# Patient Record
Sex: MALE | Race: WHITE | NOT HISPANIC OR LATINO | Employment: STUDENT | URBAN - METROPOLITAN AREA
[De-identification: names, ages, dates, MRNs, and addresses within clinical notes are randomized per-mention and may not be internally consistent; named-entity substitution may affect disease eponyms.]

---
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
## 2024-05-28 ENCOUNTER — APPOINTMENT (OUTPATIENT)
Dept: PRIMARY CARE | Facility: CLINIC | Age: 19
End: 2024-05-28
Payer: COMMERCIAL

## 2024-07-25 ENCOUNTER — APPOINTMENT (OUTPATIENT)
Dept: ALLERGY | Facility: CLINIC | Age: 19
End: 2024-07-25
Payer: COMMERCIAL

## 2024-08-01 ENCOUNTER — APPOINTMENT (OUTPATIENT)
Dept: ALLERGY | Facility: CLINIC | Age: 19
End: 2024-08-01
Payer: COMMERCIAL

## 2024-08-01 DIAGNOSIS — T78.2XXA ANAPHYLAXIS, INITIAL ENCOUNTER: ICD-10-CM

## 2024-08-01 DIAGNOSIS — Z91.012 ALLERGY TO EGGS: Primary | ICD-10-CM

## 2024-08-01 DIAGNOSIS — J45.20 MILD INTERMITTENT ASTHMA WITHOUT COMPLICATION (HHS-HCC): ICD-10-CM

## 2024-08-01 PROCEDURE — 99214 OFFICE O/P EST MOD 30 MIN: CPT | Performed by: ALLERGY & IMMUNOLOGY

## 2024-08-01 RX ORDER — EPINEPHRINE 0.3 MG/.3ML
1 INJECTION, SOLUTION INTRAMUSCULAR ONCE AS NEEDED
Qty: 2 EACH | Refills: 5 | Status: SHIPPED | OUTPATIENT
Start: 2024-08-01

## 2024-08-01 RX ORDER — ALBUTEROL SULFATE 90 UG/1
2 AEROSOL, METERED RESPIRATORY (INHALATION) EVERY 4 HOURS PRN
Qty: 20.1 G | Refills: 5 | Status: SHIPPED | OUTPATIENT
Start: 2024-08-01

## 2024-08-01 NOTE — PROGRESS NOTES
Subjective   Patient ID:   [id number]   Jarod Demarco is a 18 y.o. male who presents for Food Allergy and Wheezing.    Chief Complaint   Patient presents with    Food Allergy    Wheezing          HPI  This patient is here to evaluate for:    He had a pizza and crust had egg in it. Noticed it on the website. No allergic reaction.  But only a 1/4 of a slice.    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   has had reations even with baked egg foods.  Despite pos tests, he now tolerates TREE nuts and reports he does eat them.     While camping, Jarad had egg and he threw up one time.   Never has had to use Auvi-Q.     Not using allergy eye drops nor antihistamines, unless rarely, like around a house with a dog when he may use albtuerol also.   rare albuterol use otherwise.    Review of Systems   All other systems reviewed and are negative.        Objective     There were no vitals taken for this visit.     Physical Exam  Constitutional:       Appearance: Normal appearance.   HENT:      Head: Normocephalic and atraumatic.   Eyes:      Extraocular Movements: Extraocular movements intact.      Conjunctiva/sclera: Conjunctivae normal.      Pupils: Pupils are equal, round, and reactive to light.   Pulmonary:      Effort: Pulmonary effort is normal.   Musculoskeletal:      Cervical back: Normal range of motion.   Skin:     Findings: No rash.   Neurological:      Mental Status: He is alert and oriented to person, place, and time. Mental status is at baseline.   Psychiatric:         Mood and Affect: Mood normal.         Behavior: Behavior normal.         Thought Content: Thought content normal.         Judgment: Judgment normal.            Current Outpatient Medications   Medication Sig  Dispense Refill    albuterol (ProAir RespiClick) 90 mcg/actuation aerosol powdr breath activated inhaler Inhale.      albuterol 90 mcg/actuation inhaler Inhale 2 puffs every 4 hours if needed for wheezing, shortness of breath or other (cough). 20.1 g 5    Auvi-Q 0.3 mg/0.3 mL injection syringe Inject 0.3 mL (0.3 mg) into the muscle 1 time if needed for anaphylaxis for up to 1 dose. Inject into upper leg. Call 911 after use. 2 each 5     No current facility-administered medications for this visit.       Summary of the labs over the past 6 months:    No visits with results within 6 Month(s) from this visit.   Latest known visit with results is:   Orders Only on 07/03/2023   Component Date Value Ref Range Status    WBC 07/03/2023 5.2  4.4 - 11.3 x10E9/L Final    RBC 07/03/2023 5.20  4.50 - 5.90 x10E12/L Final    Hemoglobin 07/03/2023 15.0  13.5 - 17.5 g/dL Final    Hematocrit 07/03/2023 46.0  41.0 - 52.0 % Final    MCV 07/03/2023 88  80 - 100 fL Final    MCHC 07/03/2023 32.6  32.0 - 36.0 g/dL Final    Platelets 07/03/2023 254  150 - 450 x10E9/L Final    RDW 07/03/2023 13.5  11.5 - 14.5 % Final    Neutrophils % 07/03/2023 45.5  40.0 - 80.0 % Final    Immature Granulocytes %, Automated 07/03/2023 0.4  0.0 - 0.9 % Final    Lymphocytes % 07/03/2023 38.4  13.0 - 44.0 % Final    Monocytes % 07/03/2023 9.2  2.0 - 10.0 % Final    Eosinophils % 07/03/2023 5.7  0.0 - 6.0 % Final    Basophils % 07/03/2023 0.8  0.0 - 2.0 % Final    Neutrophils Absolute 07/03/2023 2.39  1.20 - 7.70 x10E9/L Final    Lymphocytes Absolute 07/03/2023 2.01  1.20 - 4.80 x10E9/L Final    Monocytes Absolute 07/03/2023 0.48  0.10 - 1.00 x10E9/L Final    Eosinophils Absolute 07/03/2023 0.30  0.00 - 0.70 x10E9/L Final    Basophils Absolute 07/03/2023 0.04  0.00 - 0.10 x10E9/L Final    Albumin 07/03/2023 4.5  3.4 - 5.0 g/dL Final    Total Bilirubin 07/03/2023 0.5  0.0 - 1.2 mg/dL Final    Bilirubin, Direct 07/03/2023 0.1  0.0 - 0.3 mg/dL Final    Alkaline  Phosphatase 07/03/2023 97  33 - 120 U/L Final    ALT (SGPT) 07/03/2023 14  10 - 52 U/L Final    AST 07/03/2023 21  9 - 39 U/L Final    Total Protein 07/03/2023 6.8  6.4 - 8.2 g/dL Final    Triglycerides 07/03/2023 77  0 - 149 mg/dL Final    Cholesterol 07/03/2023 157  0 - 199 mg/dL Final         Assessment/Plan       Nice to see you!    We refilled the medications.     We also discussed signs and symptoms and treatment of anaphylaxis. Your allergy can be life threatening and triggers need to be avoided. In case of a mild reaction limited to the skin, then an antihistamine may be used.  If there is a more severe allergic reaction such as throat tightness, wheezing, shortness of breath, vomiting, or other signs of anaphylaxis, then epinephrine (AUVI-Q) should be used.  We discussed how and when to use this medication.  Epinephrine expires after one year and should not be kept in a hot or cold location as this will degrade the medication.     The patient may use albuterol taking two puffs 15 minutes prior to exercise or every four hours as needed for cough, wheeze, or shortness of breath.    No need for testing today but we can discuss next year, if interested.     Follow-up in 1 year so we may re-assess you and develop a more long term plan.      Zachary Javier MD

## 2024-08-08 ENCOUNTER — APPOINTMENT (OUTPATIENT)
Dept: PRIMARY CARE | Facility: CLINIC | Age: 19
End: 2024-08-08
Payer: COMMERCIAL

## 2024-08-08 VITALS
RESPIRATION RATE: 15 BRPM | HEIGHT: 70 IN | OXYGEN SATURATION: 96 % | WEIGHT: 134 LBS | SYSTOLIC BLOOD PRESSURE: 130 MMHG | BODY MASS INDEX: 19.18 KG/M2 | TEMPERATURE: 97.1 F | DIASTOLIC BLOOD PRESSURE: 85 MMHG | HEART RATE: 74 BPM

## 2024-08-08 DIAGNOSIS — K12.0 APHTHOUS ULCER OF MOUTH: ICD-10-CM

## 2024-08-08 DIAGNOSIS — E55.9 VITAMIN D DEFICIENCY: ICD-10-CM

## 2024-08-08 DIAGNOSIS — Z13.29 THYROID DISORDER SCREEN: ICD-10-CM

## 2024-08-08 DIAGNOSIS — D84.9 IMMUNE DEFICIENCY DISORDER (MULTI): ICD-10-CM

## 2024-08-08 DIAGNOSIS — R19.5 LOOSE STOOLS: ICD-10-CM

## 2024-08-08 DIAGNOSIS — J45.20 MILD INTERMITTENT ASTHMA WITHOUT COMPLICATION (HHS-HCC): ICD-10-CM

## 2024-08-08 DIAGNOSIS — Z00.00 ANNUAL PHYSICAL EXAM: Primary | ICD-10-CM

## 2024-08-08 DIAGNOSIS — Z00.129 WELL ADOLESCENT VISIT: ICD-10-CM

## 2024-08-08 DIAGNOSIS — J84.10 PULMONARY GRANULOMA (MULTI): ICD-10-CM

## 2024-08-08 DIAGNOSIS — Z13.220 SCREENING FOR HYPERLIPIDEMIA: ICD-10-CM

## 2024-08-08 PROBLEM — R68.81 EARLY SATIETY: Status: RESOLVED | Noted: 2023-08-08 | Resolved: 2024-08-08

## 2024-08-08 PROBLEM — L03.213 PRESEPTAL CELLULITIS OF LEFT EYE: Status: RESOLVED | Noted: 2023-08-08 | Resolved: 2024-08-08

## 2024-08-08 PROCEDURE — 1036F TOBACCO NON-USER: CPT | Performed by: FAMILY MEDICINE

## 2024-08-08 PROCEDURE — 3008F BODY MASS INDEX DOCD: CPT | Performed by: FAMILY MEDICINE

## 2024-08-08 PROCEDURE — 99395 PREV VISIT EST AGE 18-39: CPT | Performed by: FAMILY MEDICINE

## 2024-08-08 RX ORDER — TRIAMCINOLONE ACETONIDE 1 MG/G
1 PASTE DENTAL 2 TIMES DAILY
COMMUNITY
End: 2024-08-08 | Stop reason: SDUPTHER

## 2024-08-08 RX ORDER — TRIAMCINOLONE ACETONIDE 1 MG/G
1 PASTE DENTAL 2 TIMES DAILY
Qty: 5 G | Refills: 2 | Status: SHIPPED | OUTPATIENT
Start: 2024-08-08

## 2024-08-08 ASSESSMENT — ENCOUNTER SYMPTOMS
ROS GI COMMENTS: INTERMITTENT LOOSE STOOLS
PALPITATIONS: 0
CHILLS: 0
FEVER: 0
ARTHRALGIAS: 0
ABDOMINAL PAIN: 0
CHEST TIGHTNESS: 0
SHORTNESS OF BREATH: 0
CONFUSION: 0

## 2024-08-08 ASSESSMENT — PATIENT HEALTH QUESTIONNAIRE - PHQ9
2. FEELING DOWN, DEPRESSED OR HOPELESS: NOT AT ALL
1. LITTLE INTEREST OR PLEASURE IN DOING THINGS: NOT AT ALL
SUM OF ALL RESPONSES TO PHQ9 QUESTIONS 1 AND 2: 0

## 2024-08-08 ASSESSMENT — COLUMBIA-SUICIDE SEVERITY RATING SCALE - C-SSRS
6. HAVE YOU EVER DONE ANYTHING, STARTED TO DO ANYTHING, OR PREPARED TO DO ANYTHING TO END YOUR LIFE?: NO
2. HAVE YOU ACTUALLY HAD ANY THOUGHTS OF KILLING YOURSELF?: NO
1. IN THE PAST MONTH, HAVE YOU WISHED YOU WERE DEAD OR WISHED YOU COULD GO TO SLEEP AND NOT WAKE UP?: NO

## 2024-08-08 NOTE — ASSESSMENT & PLAN NOTE
Keep a food diary with regards to symptoms and food and beverage consumption's.    Check labs    Over-the-counter Pepto for symptom relief since he says it does work    Return in 4 weeks to reassess case and review test results.

## 2024-08-08 NOTE — PROGRESS NOTES
"Subjective   Patient ID: Jarod Demarco is a 18 y.o. male who presents for Annual Exam.    HPI patient today for annual checkup the most part states he has been doing well he would like to get a refill on his Kenalog oral paste.  He says his asthma is been stable he really has not needed the albuterol inhaler.  He does point out that over the past 3 months he has noticed some GI upset issues he says it is very random but sometimes after eating he will get abdominal cramping and have to have a bowel movement which is rather loose.  Then he feels fine no nausea no vomiting over-the-counter Pepto seems to help alleviate symptoms.  Has not been able to identify definitive pattern or food trigger.  Denies any blood or mucus in the stools.  Appetite has been good.    Review of Systems   Constitutional:  Negative for chills and fever.   HENT:  Negative for congestion and ear pain.    Eyes:  Negative for visual disturbance.   Respiratory:  Negative for chest tightness and shortness of breath.    Cardiovascular:  Negative for chest pain and palpitations.   Gastrointestinal:  Negative for abdominal pain.        Intermittent loose stools   Musculoskeletal:  Negative for arthralgias.   Skin:  Negative for pallor.   Psychiatric/Behavioral:  Negative for confusion.        Objective   /85   Pulse 74   Temp 36.2 °C (97.1 °F)   Resp 15   Ht 1.778 m (5' 10\")   Wt 60.8 kg (134 lb)   SpO2 96%   BMI 19.23 kg/m²     Physical Exam  Vitals and nursing note reviewed.   Constitutional:       General: He is not in acute distress.     Appearance: Normal appearance. He is not ill-appearing.   HENT:      Head: Normocephalic and atraumatic.      Right Ear: Tympanic membrane, ear canal and external ear normal.      Left Ear: Tympanic membrane, ear canal and external ear normal.      Mouth/Throat:      Pharynx: Oropharynx is clear.   Eyes:      Extraocular Movements: Extraocular movements intact.   Cardiovascular:      Rate and " Rhythm: Normal rate and regular rhythm.      Pulses: Normal pulses.      Heart sounds: Normal heart sounds.   Pulmonary:      Effort: Pulmonary effort is normal.      Breath sounds: Normal breath sounds.   Abdominal:      General: Abdomen is flat. Bowel sounds are normal.      Palpations: Abdomen is soft.      Tenderness: There is no abdominal tenderness.   Musculoskeletal:         General: Normal range of motion.      Cervical back: Neck supple.   Skin:     General: Skin is warm.   Neurological:      Mental Status: He is alert and oriented to person, place, and time. Mental status is at baseline.   Psychiatric:         Mood and Affect: Mood normal.     Check fasting lab work    Keep a food diary    Call if anything worsens otherwise return in 4 weeks to reassess his case    Assessment/Plan   Problem List Items Addressed This Visit             ICD-10-CM    Asthma ([facility]) J45.909     Stable          Immune deficiency disorder (Multi) D84.9     Clinically stable follows with allergy and immunology         Pulmonary granuloma (Multi) J84.10     Clinically stable previous pulmonary evaluation unremarkable felt to be benign process.         Vitamin D deficiency E55.9     Check vitamin D level supplement as needed         Relevant Orders    Vitamin D 25-Hydroxy,Total (for eval of Vitamin D levels)    Well adolescent visit Z00.129    Thyroid disorder screen Z13.29     TSH with reflex         Relevant Orders    TSH with reflex to Free T4 if abnormal    Annual physical exam - Primary Z00.00     Check fasting labs    Flu shot in the fall    Maintain healthy diet and lifestyle habits         Relevant Orders    CBC    Comprehensive Metabolic Panel    Lipid Panel    TSH with reflex to Free T4 if abnormal    Screening for hyperlipidemia Z13.220     Fasting lipid         Relevant Orders    Lipid Panel    Loose stools R19.5     Keep a food diary with regards to symptoms and food and beverage consumption's.    Check  labs    Over-the-counter Pepto for symptom relief since he says it does work    Return in 4 weeks to reassess case and review test results.         Relevant Orders    Celiac Panel    Follow Up In Primary Care - Established    Aphthous ulcer of mouth K12.0     Refill Kenalog oral paste         Relevant Medications    triamcinolone (Kenalog) 0.1 % oral paste

## 2024-08-12 DIAGNOSIS — R19.5 LOOSE STOOLS: Primary | ICD-10-CM

## 2024-08-14 ENCOUNTER — LAB (OUTPATIENT)
Dept: LAB | Facility: LAB | Age: 19
End: 2024-08-14
Payer: COMMERCIAL

## 2024-08-14 DIAGNOSIS — Z13.29 THYROID DISORDER SCREEN: ICD-10-CM

## 2024-08-14 DIAGNOSIS — Z13.220 SCREENING FOR HYPERLIPIDEMIA: ICD-10-CM

## 2024-08-14 DIAGNOSIS — R19.5 LOOSE STOOLS: ICD-10-CM

## 2024-08-14 DIAGNOSIS — E55.9 VITAMIN D DEFICIENCY: ICD-10-CM

## 2024-08-14 DIAGNOSIS — Z00.00 ANNUAL PHYSICAL EXAM: ICD-10-CM

## 2024-08-14 LAB
25(OH)D3 SERPL-MCNC: 29 NG/ML (ref 30–100)
ALBUMIN SERPL BCP-MCNC: 4.4 G/DL (ref 3.4–5)
ALP SERPL-CCNC: 76 U/L (ref 33–120)
ALT SERPL W P-5'-P-CCNC: 54 U/L (ref 10–52)
ANION GAP SERPL CALC-SCNC: 10 MMOL/L (ref 10–20)
AST SERPL W P-5'-P-CCNC: 35 U/L (ref 9–39)
BILIRUB SERPL-MCNC: 0.4 MG/DL (ref 0–1.2)
BUN SERPL-MCNC: 12 MG/DL (ref 6–23)
CALCIUM SERPL-MCNC: 9.9 MG/DL (ref 8.6–10.3)
CHLORIDE SERPL-SCNC: 107 MMOL/L (ref 98–107)
CHOLEST SERPL-MCNC: 159 MG/DL (ref 0–199)
CHOLESTEROL/HDL RATIO: 3.7
CO2 SERPL-SCNC: 27 MMOL/L (ref 21–32)
CREAT SERPL-MCNC: 0.77 MG/DL (ref 0.5–1.3)
EGFRCR SERPLBLD CKD-EPI 2021: >90 ML/MIN/1.73M*2
ERYTHROCYTE [DISTWIDTH] IN BLOOD BY AUTOMATED COUNT: 14.7 % (ref 11.5–14.5)
GLUCOSE SERPL-MCNC: 86 MG/DL (ref 74–99)
HCT VFR BLD AUTO: 44.6 % (ref 41–52)
HDLC SERPL-MCNC: 42.9 MG/DL
HGB BLD-MCNC: 14.7 G/DL (ref 13.5–17.5)
LDLC SERPL CALC-MCNC: 96 MG/DL
MCH RBC QN AUTO: 28.8 PG (ref 26–34)
MCHC RBC AUTO-ENTMCNC: 33 G/DL (ref 32–36)
MCV RBC AUTO: 88 FL (ref 80–100)
NON HDL CHOLESTEROL: 116 MG/DL (ref 0–119)
NRBC BLD-RTO: 0 /100 WBCS (ref 0–0)
PLATELET # BLD AUTO: 203 X10*3/UL (ref 150–450)
POTASSIUM SERPL-SCNC: 4.4 MMOL/L (ref 3.5–5.3)
PROT SERPL-MCNC: 7 G/DL (ref 6.4–8.2)
RBC # BLD AUTO: 5.1 X10*6/UL (ref 4.5–5.9)
SODIUM SERPL-SCNC: 140 MMOL/L (ref 136–145)
TRIGL SERPL-MCNC: 100 MG/DL (ref 0–149)
TSH SERPL-ACNC: 0.72 MIU/L (ref 0.44–3.98)
VLDL: 20 MG/DL (ref 0–40)
WBC # BLD AUTO: 3.9 X10*3/UL (ref 4.4–11.3)

## 2024-08-14 PROCEDURE — 83516 IMMUNOASSAY NONANTIBODY: CPT

## 2024-08-14 PROCEDURE — 80061 LIPID PANEL: CPT

## 2024-08-14 PROCEDURE — 84443 ASSAY THYROID STIM HORMONE: CPT

## 2024-08-14 PROCEDURE — 85027 COMPLETE CBC AUTOMATED: CPT

## 2024-08-14 PROCEDURE — 82306 VITAMIN D 25 HYDROXY: CPT

## 2024-08-14 PROCEDURE — 80053 COMPREHEN METABOLIC PANEL: CPT

## 2024-08-14 PROCEDURE — 36415 COLL VENOUS BLD VENIPUNCTURE: CPT

## 2024-08-15 ENCOUNTER — TELEPHONE (OUTPATIENT)
Dept: PRIMARY CARE | Facility: CLINIC | Age: 19
End: 2024-08-15

## 2024-08-15 ENCOUNTER — LAB (OUTPATIENT)
Dept: LAB | Facility: LAB | Age: 19
End: 2024-08-15
Payer: COMMERCIAL

## 2024-08-15 DIAGNOSIS — R19.5 LOOSE STOOLS: ICD-10-CM

## 2024-08-15 LAB
GLIADIN PEPTIDE IGA SER IA-ACNC: 14.5 U/ML
TTG IGA SER IA-ACNC: <1 U/ML

## 2024-08-15 PROCEDURE — 87506 IADNA-DNA/RNA PROBE TQ 6-11: CPT

## 2024-08-15 NOTE — TELEPHONE ENCOUNTER
The lab called to report that the C-diff PCR lab was cancelled due to the stool being formed when it was received.

## 2024-08-16 LAB

## 2024-08-17 LAB
GLIADIN PEPTIDE IGG SER IA-ACNC: <0.56 FLU (ref 0–4.99)
TTG IGG SER IA-ACNC: <0.82 FLU (ref 0–4.99)

## 2024-09-12 ENCOUNTER — APPOINTMENT (OUTPATIENT)
Dept: PRIMARY CARE | Facility: CLINIC | Age: 19
End: 2024-09-12
Payer: COMMERCIAL

## 2024-09-12 DIAGNOSIS — R79.89 ABNORMAL LFTS: ICD-10-CM

## 2024-09-12 DIAGNOSIS — D72.819 LEUKOPENIA, UNSPECIFIED TYPE: ICD-10-CM

## 2024-09-12 DIAGNOSIS — R19.5 LOOSE STOOLS: Primary | ICD-10-CM

## 2024-09-12 PROCEDURE — 99213 OFFICE O/P EST LOW 20 MIN: CPT | Performed by: FAMILY MEDICINE

## 2024-09-12 PROCEDURE — 1036F TOBACCO NON-USER: CPT | Performed by: FAMILY MEDICINE

## 2024-09-12 ASSESSMENT — ENCOUNTER SYMPTOMS
FEVER: 0
ABDOMINAL PAIN: 0
SHORTNESS OF BREATH: 0
CONFUSION: 0
CHILLS: 0
ARTHRALGIAS: 0
CHEST TIGHTNESS: 0
ROS GI COMMENTS: LOOSE STOOLS
PALPITATIONS: 0

## 2024-09-12 NOTE — PROGRESS NOTES
Subjective   Patient ID: Jarod Demarco is a 18 y.o. male who presents for No chief complaint on file..    HPI patient seen via telehealth visit as he is at [facility] the [facility].  Visits for follow-up on his GI issues.  He still states he is having loose stools nothing has gotten any worse.  The only common trigger seems to be spicy foods.  No abdominal pain no nausea no vomiting no blood or mucus in the stools.    Review of Systems   Constitutional:  Negative for chills and fever.   HENT:  Negative for congestion and ear pain.    Eyes:  Negative for visual disturbance.   Respiratory:  Negative for chest tightness and shortness of breath.    Cardiovascular:  Negative for chest pain and palpitations.   Gastrointestinal:  Negative for abdominal pain.        Loose stools   Musculoskeletal:  Negative for arthralgias.   Skin:  Negative for pallor.   Psychiatric/Behavioral:  Negative for confusion.        Objective   There were no vitals taken for this visit.    Physical Exam  Nursing note reviewed.   Constitutional:       General: He is not in acute distress.     Appearance: He is not ill-appearing.     Labs from few weeks ago reviewed with the patient.  White blood cell count slightly decreased.  ALT minimally increased other liver enzymes are normal  Mild vitamin D deficiency take over-the-counter vitamin D3 2000 units daily  Celiac panel negative stool pathogen panel negative    Avoid spicy foods stay well-hydrated  Will refer to gastroenterology for second opinion due to persistent symptoms  Call or go to ER if anything worsens in the meantime  Over-the-counter Imodium or Pepto if necessary    Recheck CBC and CMP when he is back in town    Return to our office in December when he is home on break.      Assessment/Plan   Problem List Items Addressed This Visit             ICD-10-CM    Loose stools - Primary R19.5     Avoid spicy foods since that seems to be a trigger.    GI referral due to persistent  symptoms         Relevant Orders    Follow Up In Primary Care - Established    CBC    Comprehensive Metabolic Panel    Referral to Gastroenterology    Abnormal LFTs R79.89     ALT minimally elevated recheck a CMP within the next couple months.  GI referral         Relevant Orders    Comprehensive Metabolic Panel    Leukopenia D72.819     Minimally decreased recheck CBC within the next couple months         Relevant Orders    CBC

## 2024-12-17 ENCOUNTER — APPOINTMENT (OUTPATIENT)
Dept: PRIMARY CARE | Facility: CLINIC | Age: 19
End: 2024-12-17
Payer: COMMERCIAL

## 2025-08-07 ENCOUNTER — APPOINTMENT (OUTPATIENT)
Dept: ALLERGY | Facility: CLINIC | Age: 20
End: 2025-08-07
Payer: COMMERCIAL

## 2025-08-07 ENCOUNTER — OFFICE VISIT (OUTPATIENT)
Dept: PRIMARY CARE | Facility: CLINIC | Age: 20
End: 2025-08-07
Payer: COMMERCIAL

## 2025-08-07 VITALS
WEIGHT: 137.8 LBS | SYSTOLIC BLOOD PRESSURE: 118 MMHG | HEIGHT: 70 IN | TEMPERATURE: 98.6 F | DIASTOLIC BLOOD PRESSURE: 84 MMHG | HEART RATE: 80 BPM | OXYGEN SATURATION: 99 % | RESPIRATION RATE: 14 BRPM | BODY MASS INDEX: 19.73 KG/M2

## 2025-08-07 VITALS
DIASTOLIC BLOOD PRESSURE: 83 MMHG | SYSTOLIC BLOOD PRESSURE: 123 MMHG | BODY MASS INDEX: 19.97 KG/M2 | WEIGHT: 139.2 LBS | HEART RATE: 71 BPM

## 2025-08-07 DIAGNOSIS — R53.83 OTHER FATIGUE: ICD-10-CM

## 2025-08-07 DIAGNOSIS — J45.20 MILD INTERMITTENT ASTHMA WITHOUT COMPLICATION (HHS-HCC): ICD-10-CM

## 2025-08-07 DIAGNOSIS — E55.9 VITAMIN D DEFICIENCY: ICD-10-CM

## 2025-08-07 DIAGNOSIS — J84.10 PULMONARY GRANULOMA (MULTI): ICD-10-CM

## 2025-08-07 DIAGNOSIS — R19.5 LOOSE STOOLS: ICD-10-CM

## 2025-08-07 DIAGNOSIS — Z91.012 ALLERGY TO EGGS: ICD-10-CM

## 2025-08-07 DIAGNOSIS — Z00.00 ANNUAL PHYSICAL EXAM: Primary | ICD-10-CM

## 2025-08-07 DIAGNOSIS — Z13.6 SCREENING FOR CARDIOVASCULAR CONDITION: ICD-10-CM

## 2025-08-07 DIAGNOSIS — T78.2XXA ANAPHYLAXIS, INITIAL ENCOUNTER: ICD-10-CM

## 2025-08-07 DIAGNOSIS — K12.0 APHTHOUS ULCER OF MOUTH: ICD-10-CM

## 2025-08-07 DIAGNOSIS — D84.9 IMMUNE DEFICIENCY DISORDER (MULTI): ICD-10-CM

## 2025-08-07 PROBLEM — Z13.29 THYROID DISORDER SCREEN: Status: RESOLVED | Noted: 2023-08-08 | Resolved: 2025-08-07

## 2025-08-07 PROBLEM — Z13.220 SCREENING FOR HYPERLIPIDEMIA: Status: RESOLVED | Noted: 2024-08-08 | Resolved: 2025-08-07

## 2025-08-07 PROBLEM — Z00.129 WELL ADOLESCENT VISIT: Status: RESOLVED | Noted: 2023-08-08 | Resolved: 2025-08-07

## 2025-08-07 PROBLEM — T78.00XA ANAPHYLACTIC SHOCK DUE TO ADVERSE FOOD REACTION: Status: RESOLVED | Noted: 2023-08-08 | Resolved: 2025-08-07

## 2025-08-07 PROCEDURE — 1036F TOBACCO NON-USER: CPT | Performed by: FAMILY MEDICINE

## 2025-08-07 PROCEDURE — 99395 PREV VISIT EST AGE 18-39: CPT | Performed by: FAMILY MEDICINE

## 2025-08-07 PROCEDURE — 99214 OFFICE O/P EST MOD 30 MIN: CPT | Performed by: ALLERGY & IMMUNOLOGY

## 2025-08-07 PROCEDURE — 3008F BODY MASS INDEX DOCD: CPT | Performed by: FAMILY MEDICINE

## 2025-08-07 RX ORDER — EPINEPHRINE 0.3 MG/.3ML
1 INJECTION, SOLUTION INTRAMUSCULAR ONCE AS NEEDED
Qty: 2 EACH | Refills: 5 | Status: SHIPPED | OUTPATIENT
Start: 2025-08-07

## 2025-08-07 RX ORDER — ALBUTEROL SULFATE 90 UG/1
2 INHALANT RESPIRATORY (INHALATION) EVERY 4 HOURS PRN
Qty: 20.1 G | Refills: 5 | Status: SHIPPED | OUTPATIENT
Start: 2025-08-07

## 2025-08-07 RX ORDER — TRIAMCINOLONE ACETONIDE 1 MG/G
1 PASTE DENTAL 2 TIMES DAILY
Qty: 5 G | Refills: 2 | Status: SHIPPED | OUTPATIENT
Start: 2025-08-07

## 2025-08-07 ASSESSMENT — ENCOUNTER SYMPTOMS
SHORTNESS OF BREATH: 0
CHILLS: 0
PALPITATIONS: 0
CHEST TIGHTNESS: 0
ABDOMINAL PAIN: 0
ARTHRALGIAS: 0
FEVER: 0
CONFUSION: 0

## 2025-08-07 ASSESSMENT — PATIENT HEALTH QUESTIONNAIRE - PHQ9
1. LITTLE INTEREST OR PLEASURE IN DOING THINGS: NOT AT ALL
SUM OF ALL RESPONSES TO PHQ9 QUESTIONS 1 AND 2: 0
2. FEELING DOWN, DEPRESSED OR HOPELESS: NOT AT ALL

## 2025-08-07 NOTE — ASSESSMENT & PLAN NOTE
Symptomatically is resolved GI evaluation noted patient adjusted his diet avoids food triggers and is doing much better.

## 2025-08-07 NOTE — PROGRESS NOTES
Subjective   Patient ID:   [id number]   Jarod Demarco is a 19 y.o. male who presents for Allergies (Here for yearly FUV for egg allergy.  Had a reaction about a month ago d/t accidental ingestion.  Only vomited, did not need to use epi pen.).    Chief Complaint   Patient presents with    Allergies     Here for yearly FUV for egg allergy.  Had a reaction about a month ago d/t accidental ingestion.  Only vomited, did not need to use epi pen.          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  Threw up 1 time - this was unusual because it took an hour.    Also over spring break, he had the bite and instantly threw up.  It was a garlic aioli that had egg in it.     Asthma - rare use   Used around pets  Rarely, with viruses but has not been sick recently.   No exercise triggers.    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        Objective     /83 (BP Location: Right arm, Patient Position: Sitting)   Pulse 71   Wt 63.1 kg (139 lb 3.2 oz)   BMI 19.97 kg/m²      Physical Exam  Constitutional:       General: He is not in acute distress.     Appearance: Normal appearance. He is not ill-appearing.   HENT:      Head: Normocephalic and atraumatic.      Right Ear: Tympanic membrane, ear canal and external ear normal.      Left Ear: Tympanic membrane, ear canal and external ear normal.      Nose: Nose normal. No congestion or rhinorrhea.      Mouth/Throat:      Mouth: Mucous membranes are moist.      Pharynx: Oropharynx is clear. No oropharyngeal exudate or posterior oropharyngeal erythema.     Eyes:      General:         Right eye: No discharge.         Left eye: No discharge.      Conjunctiva/sclera: Conjunctivae normal.       Cardiovascular:      Rate and Rhythm: Normal  rate and regular rhythm.      Heart sounds: Normal heart sounds. No murmur heard.     No friction rub. No gallop.   Pulmonary:      Effort: Pulmonary effort is normal. No respiratory distress.      Breath sounds: Normal breath sounds. No stridor. No wheezing, rhonchi or rales.   Chest:      Chest wall: No tenderness.   Abdominal:      General: Abdomen is flat.      Palpations: Abdomen is soft.     Musculoskeletal:         General: Normal range of motion.      Cervical back: Normal range of motion and neck supple.   Lymphadenopathy:      Cervical: No cervical adenopathy.     Skin:     General: Skin is warm and dry.      Findings: No erythema, lesion or rash.     Neurological:      General: No focal deficit present.      Mental Status: He is alert. Mental status is at baseline.     Psychiatric:         Mood and Affect: Mood normal.         Behavior: Behavior normal.         Thought Content: Thought content normal.         Judgment: Judgment normal.            Current Medications[1]    Summary of the labs over the past 6 months:    No visits with results within 6 Month(s) from this visit.   Latest known visit with results is:   Lab on 08/15/2024   Component Date Value Ref Range Status    Campylobacter Group 08/15/2024 Not Detected  Not Detected Final    Salmonella species 08/15/2024 Not Detected  Not Detected Final    Shigella species 08/15/2024 Not Detected  Not Detected Final    Vibrio Group 08/15/2024 Not Detected  Not Detected Final    Yersinia Enterocolitica 08/15/2024 Not Detected  Not Detected Final    Shiga Toxin 1 08/15/2024 Not Detected  Not Detected Final    Shiga Toxin 2 08/15/2024 Not Detected  Not Detected Final    Norovirus GI/GII 08/15/2024 Not Detected  Not Detected Final    Rotavirus A 08/15/2024 Not Detected  Not Detected Final         Assessment/Plan   Diagnoses and all orders for this visit:  Mild intermittent asthma without complication ([facility])  -     albuterol 90 mcg/actuation inhaler; Inhale 2  puffs every 4 hours if needed for wheezing, shortness of breath or other (cough).  Allergy to eggs  -     Auvi-Q 0.3 mg/0.3 mL injection syringe; Inject 0.3 mL (0.3 mg) into the muscle 1 time if needed for anaphylaxis for up to 1 dose. Inject into upper leg. Call 911 after use.  Anaphylaxis, initial encounter  -     Auvi-Q 0.3 mg/0.3 mL injection syringe; Inject 0.3 mL (0.3 mg) into the muscle 1 time if needed for anaphylaxis for up to 1 dose. Inject into upper leg. Call 911 after use.    Nice to see you!    Continue to avoid eggs completely.     Refilled your medications.         Zachary Javier MD            [1]   Current Outpatient Medications   Medication Sig Dispense Refill    albuterol 90 mcg/actuation inhaler Inhale 2 puffs every 4 hours if needed for wheezing, shortness of breath or other (cough). 20.1 g 5    Auvi-Q 0.3 mg/0.3 mL injection syringe Inject 0.3 mL (0.3 mg) into the muscle 1 time if needed for anaphylaxis for up to 1 dose. Inject into upper leg. Call 911 after use. 2 each 5    triamcinolone (Kenalog) 0.1 % oral paste Use 1 Application in the mouth or throat 2 times a day. Apply to mouth. 5 g 2     No current facility-administered medications for this visit.

## 2025-08-07 NOTE — PROGRESS NOTES
"Subjective   Patient ID: Jarod Demarco is a 19 y.o. male who presents for Annual Exam.    HPI   Patient here for annual checkup overall doing well.  Says previous GI symptoms have resolved he is learning different food triggers and tends to avoid them.  He did see GI doctor for evaluation workup was essentially unremarkable.  He continues to follow with his allergy immunology specialist.  Denies any recent issues.  Review of Systems   Constitutional:  Negative for chills and fever.   HENT:  Negative for congestion and ear pain.    Eyes:  Negative for visual disturbance.   Respiratory:  Negative for chest tightness and shortness of breath.    Cardiovascular:  Negative for chest pain and palpitations.   Gastrointestinal:  Negative for abdominal pain.   Musculoskeletal:  Negative for arthralgias.   Skin:  Negative for pallor.   Psychiatric/Behavioral:  Negative for confusion.        Objective   /84 (BP Location: Left arm, Patient Position: Sitting, BP Cuff Size: Adult)   Pulse 80   Temp 37 °C (98.6 °F) (Temporal)   Resp 14   Ht 1.778 m (5' 10\")   Wt 62.5 kg (137 lb 12.8 oz)   SpO2 99%   BMI 19.77 kg/m²     Physical Exam  Vitals and nursing note reviewed.   Constitutional:       General: He is not in acute distress.     Appearance: Normal appearance. He is not ill-appearing.   HENT:      Head: Normocephalic and atraumatic.      Right Ear: Tympanic membrane, ear canal and external ear normal.      Left Ear: Tympanic membrane, ear canal and external ear normal.      Mouth/Throat:      Pharynx: Oropharynx is clear.     Eyes:      Extraocular Movements: Extraocular movements intact.       Cardiovascular:      Rate and Rhythm: Normal rate and regular rhythm.      Pulses: Normal pulses.      Heart sounds: Normal heart sounds.   Pulmonary:      Effort: Pulmonary effort is normal.      Breath sounds: Normal breath sounds.   Abdominal:      General: Abdomen is flat. Bowel sounds are normal.      Palpations: " Abdomen is soft.      Tenderness: There is no abdominal tenderness.     Musculoskeletal:         General: Normal range of motion.      Cervical back: Neck supple.     Skin:     General: Skin is warm.     Neurological:      Mental Status: He is alert and oriented to person, place, and time. Mental status is at baseline.     Psychiatric:         Mood and Affect: Mood normal.     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         RESOLVED: Aphthous ulcer of mouth K12.0    Relevant Medications    triamcinolone (Kenalog) 0.1 % oral paste    Screening for cardiovascular condition Z13.6    Fasting lipid         Relevant Orders    Lipid Panel    Other fatigue R53.83    TSH with reflex         Relevant Orders    CBC    Comprehensive Metabolic Panel    TSH with reflex to Free T4 if abnormal

## 2025-08-08 LAB
25(OH)D3+25(OH)D2 SERPL-MCNC: 33 NG/ML (ref 30–100)
ALBUMIN SERPL-MCNC: 5.2 G/DL (ref 3.6–5.1)
ALP SERPL-CCNC: 39 U/L (ref 36–130)
ALT SERPL-CCNC: 10 U/L (ref 9–46)
ANION GAP SERPL CALCULATED.4IONS-SCNC: 8 MMOL/L (CALC) (ref 7–17)
AST SERPL-CCNC: 14 U/L (ref 10–40)
BILIRUB SERPL-MCNC: 0.7 MG/DL (ref 0.2–1.2)
BUN SERPL-MCNC: 12 MG/DL (ref 7–25)
CALCIUM SERPL-MCNC: 10.5 MG/DL (ref 8.6–10.3)
CHLORIDE SERPL-SCNC: 102 MMOL/L (ref 98–110)
CHOLEST SERPL-MCNC: 169 MG/DL
CHOLEST/HDLC SERPL: 2.4 (CALC)
CO2 SERPL-SCNC: 28 MMOL/L (ref 20–32)
CREAT SERPL-MCNC: 0.84 MG/DL (ref 0.6–1.24)
EGFRCR SERPLBLD CKD-EPI 2021: 128 ML/MIN/1.73M2
ERYTHROCYTE [DISTWIDTH] IN BLOOD BY AUTOMATED COUNT: 13 % (ref 11–15)
GLUCOSE SERPL-MCNC: 84 MG/DL (ref 65–99)
HCT VFR BLD AUTO: 48.4 % (ref 38.5–50)
HDLC SERPL-MCNC: 71 MG/DL
HGB BLD-MCNC: 16.1 G/DL (ref 13.2–17.1)
LDLC SERPL CALC-MCNC: 84 MG/DL (CALC)
MCH RBC QN AUTO: 29.1 PG (ref 27–33)
MCHC RBC AUTO-ENTMCNC: 33.3 G/DL (ref 32–36)
MCV RBC AUTO: 87.5 FL (ref 80–100)
NONHDLC SERPL-MCNC: 98 MG/DL (CALC)
PLATELET # BLD AUTO: 206 THOUSAND/UL (ref 140–400)
PMV BLD REES-ECKER: 9.8 FL (ref 7.5–12.5)
POTASSIUM SERPL-SCNC: 4.4 MMOL/L (ref 3.5–5.3)
PROT SERPL-MCNC: 7.7 G/DL (ref 6.1–8.1)
RBC # BLD AUTO: 5.53 MILLION/UL (ref 4.2–5.8)
SODIUM SERPL-SCNC: 138 MMOL/L (ref 135–146)
TRIGL SERPL-MCNC: 61 MG/DL
TSH SERPL-ACNC: 1.31 MIU/L (ref 0.4–4.5)
WBC # BLD AUTO: 4.8 THOUSAND/UL (ref 3.8–10.8)

## 2026-08-06 ENCOUNTER — APPOINTMENT (OUTPATIENT)
Dept: ALLERGY | Facility: CLINIC | Age: 21
End: 2026-08-06
Payer: COMMERCIAL

## 2026-08-10 ENCOUNTER — APPOINTMENT (OUTPATIENT)
Dept: PRIMARY CARE | Facility: CLINIC | Age: 21
End: 2026-08-10
Payer: COMMERCIAL